# Patient Record
Sex: MALE | Race: WHITE | NOT HISPANIC OR LATINO | URBAN - METROPOLITAN AREA
[De-identification: names, ages, dates, MRNs, and addresses within clinical notes are randomized per-mention and may not be internally consistent; named-entity substitution may affect disease eponyms.]

---

## 2023-10-01 ENCOUNTER — HOSPITAL ENCOUNTER (EMERGENCY)
Facility: HOSPITAL | Age: 88
Discharge: HOME | End: 2023-10-01
Attending: EMERGENCY MEDICINE
Payer: MEDICARE

## 2023-10-01 ENCOUNTER — APPOINTMENT (OUTPATIENT)
Dept: RADIOLOGY | Facility: HOSPITAL | Age: 88
End: 2023-10-01
Payer: MEDICARE

## 2023-10-01 VITALS
SYSTOLIC BLOOD PRESSURE: 132 MMHG | OXYGEN SATURATION: 98 % | DIASTOLIC BLOOD PRESSURE: 51 MMHG | WEIGHT: 192 LBS | TEMPERATURE: 98.9 F | BODY MASS INDEX: 32.78 KG/M2 | RESPIRATION RATE: 16 BRPM | HEIGHT: 64 IN | HEART RATE: 72 BPM

## 2023-10-01 DIAGNOSIS — M25.561 ACUTE PAIN OF RIGHT KNEE: Primary | ICD-10-CM

## 2023-10-01 PROCEDURE — 99283 EMERGENCY DEPT VISIT LOW MDM: CPT | Performed by: EMERGENCY MEDICINE

## 2023-10-01 PROCEDURE — 73564 X-RAY EXAM KNEE 4 OR MORE: CPT | Mod: RT,FY

## 2023-10-01 PROCEDURE — 73564 X-RAY EXAM KNEE 4 OR MORE: CPT | Mod: RIGHT SIDE | Performed by: STUDENT IN AN ORGANIZED HEALTH CARE EDUCATION/TRAINING PROGRAM

## 2023-10-01 PROCEDURE — 2500000001 HC RX 250 WO HCPCS SELF ADMINISTERED DRUGS (ALT 637 FOR MEDICARE OP): Performed by: EMERGENCY MEDICINE

## 2023-10-01 RX ORDER — HYDROCODONE BITARTRATE AND ACETAMINOPHEN 5; 325 MG/1; MG/1
1 TABLET ORAL ONCE
Status: COMPLETED | OUTPATIENT
Start: 2023-10-01 | End: 2023-10-01

## 2023-10-01 RX ADMIN — HYDROCODONE BITARTRATE AND ACETAMINOPHEN 1 TABLET: 5; 325 TABLET ORAL at 21:02

## 2023-10-01 ASSESSMENT — COLUMBIA-SUICIDE SEVERITY RATING SCALE - C-SSRS
1. IN THE PAST MONTH, HAVE YOU WISHED YOU WERE DEAD OR WISHED YOU COULD GO TO SLEEP AND NOT WAKE UP?: NO
6. HAVE YOU EVER DONE ANYTHING, STARTED TO DO ANYTHING, OR PREPARED TO DO ANYTHING TO END YOUR LIFE?: NO
2. HAVE YOU ACTUALLY HAD ANY THOUGHTS OF KILLING YOURSELF?: NO

## 2023-10-01 ASSESSMENT — PAIN SCALES - GENERAL: PAINLEVEL_OUTOF10: 10 - WORST POSSIBLE PAIN

## 2023-10-01 ASSESSMENT — PAIN DESCRIPTION - LOCATION: LOCATION: KNEE

## 2023-10-01 ASSESSMENT — PAIN - FUNCTIONAL ASSESSMENT: PAIN_FUNCTIONAL_ASSESSMENT: 0-10

## 2023-10-01 ASSESSMENT — PAIN DESCRIPTION - ORIENTATION: ORIENTATION: RIGHT

## 2023-10-02 NOTE — ED PROVIDER NOTES
History/Exam limitations: none.   Additional history was obtained from relative(s).    HPI:    Jakob Alexander is a 89 y.o. male presenting for evaluation of right knee pain.  Patient states that he had a mechanical fall yesterday where he twisted his right knee and landed on his left elbow.  Denies any significant elbow pain.  Denies any head strike.  No anticoagulation use.  No focal weakness or numbness or vision changes.  States that he is developed pain in the knee with some swelling since.  States that it primarily hurts when he has the knee bent or when he tries to stand up from a sitting position.  Has been bearing weight and ambulatory with a walker at home.  No prior injuries to the knee.  No focal weakness or numbness.        Physical Exam:  ED Triage Vitals [10/01/23 1956]   Temp Heart Rate Resp BP   37.2 °C (98.9 °F) 70 16 163/64      SpO2 Temp Source Heart Rate Source Patient Position   98 % Temporal Monitor Lying      BP Location FiO2 (%)     Left arm --        GEN:      Alert, NAD  Eyes:       PERRL, EOMI  HENT:      NC/AT, OP clear, airway patent, MM  CV:      RRR, no MRG, no LE pitting edema, 2+ radial and pedal pulses  PULM:     CTAB, no w/r/r, easy WOB, symmetric chest rise  ABD:      Soft, NT, ND, no masses, BS +  :       No CVA TTP  NEURO:   A&Ox3, no focal deficits  MSK:      Pain with extension of the R knee but extensor mechanism intact, mild effusion, diffuse tenderness, no significant joint laxity to varus or valgus stress, negative Lachman, negative anterior posterior drawer  SKIN:       Warm and dry  PSYCH:    Appropriate mood and affect         MDM/ED Course:   Jakob Alexander is a 89 y.o. male presenting for evaluation of right knee pain.  Vitals reassuring.  Exam as documented above.  Differential includes right knee fracture, strain, other soft tissue/ligamentous or meniscal injury.  Low suspicion for inflammatory or infectious arthritis process given posttraumatic.  Differential  includes damage to patellar/quadriceps tendon given symptoms, extensor mechanism is intact which is somewhat reassuring.  Patient was given p.o. Norco for pain with improvement in symptoms.  X-ray obtained and displays signs of tricompartmental osteoarthritis and small joint effusion, possible loose body and Baker's cyst, no acute fractures or malalignment.  Patient has been weightbearing so lower suspicion for tibial plateau fracture.  Knee immobilizer placed and patient feels comfortable with getting around at home with a walker while using knee immobilizer.  Offered hospitalization for PT OT however patient feels comfortable returning home and family feels comfortable this plan as well.  Patient was explained findings, exam/study results, the importance of follow up and the plan moving forward; patient verbalized understanding and agreement. All questions were answered and no new questions at this time. Patient will be discharged with strict return precautions, follow up plan with orthopedic surgery/PCP.     ED Course as of 10/04/23 1855   Sun Oct 01, 2023   [id number] Imaging overall reassuring, does show some enthesopathic findings of the quadricep tendon near attachment to the patella, potential source of pain.  Plan for immobilizer.  Patient feels comfortable ambulating with immobilizer and assistance of his walker which she has been using at home.  Pain is minimal when he is not bending the knee.  Plan for outpatient orthopedic follow-up.  Rest, ice, elevation. [JM]      ED Course User Index  [JM] Roberto Corado MD         Chronic medical conditions affecting care listed in MDM. I independently reviewed imaging studies and agreed with radiology reads. I reviewed recent and relevant outside records including PCP notes, Prior discharge summaries, and prior radiology reports.    Procedure  Procedures    Diagnosis:   1.  Right knee pain    Dispo: Discharged in stable condition      Disclaimer: Portions of this note  were dictated by speech recognition. An attempt at proof reading was made to minimize errors. Minor errors in transcription may be present.  Please call if questions.     Roberto Corado MD  10/04/23 2327